# Patient Record
Sex: MALE | Race: WHITE | NOT HISPANIC OR LATINO | Employment: OTHER | URBAN - METROPOLITAN AREA
[De-identification: names, ages, dates, MRNs, and addresses within clinical notes are randomized per-mention and may not be internally consistent; named-entity substitution may affect disease eponyms.]

---

## 2019-04-07 ENCOUNTER — APPOINTMENT (OUTPATIENT)
Dept: RADIOLOGY | Facility: MEDICAL CENTER | Age: 34
End: 2019-04-07
Attending: EMERGENCY MEDICINE
Payer: COMMERCIAL

## 2019-04-07 ENCOUNTER — APPOINTMENT (OUTPATIENT)
Dept: RADIOLOGY | Facility: MEDICAL CENTER | Age: 34
End: 2019-04-07
Attending: SURGERY
Payer: COMMERCIAL

## 2019-04-07 ENCOUNTER — HOSPITAL ENCOUNTER (OUTPATIENT)
Facility: MEDICAL CENTER | Age: 34
End: 2019-04-08
Attending: EMERGENCY MEDICINE | Admitting: SURGERY
Payer: COMMERCIAL

## 2019-04-07 DIAGNOSIS — S06.0X0A CONCUSSION WITHOUT LOSS OF CONSCIOUSNESS, INITIAL ENCOUNTER: ICD-10-CM

## 2019-04-07 DIAGNOSIS — G51.4 FACIAL TWITCHING: ICD-10-CM

## 2019-04-07 DIAGNOSIS — R47.01 EXPRESSIVE APHASIA: ICD-10-CM

## 2019-04-07 PROBLEM — F90.9 ADHD (ATTENTION DEFICIT HYPERACTIVITY DISORDER): Status: ACTIVE | Noted: 2019-04-07

## 2019-04-07 PROBLEM — T14.90XA TRAUMA: Status: ACTIVE | Noted: 2019-04-07

## 2019-04-07 PROBLEM — Z02.9 DISCHARGE PLANNING ISSUES: Status: ACTIVE | Noted: 2019-04-07

## 2019-04-07 PROBLEM — Z75.8 DISCHARGE PLANNING ISSUES: Status: ACTIVE | Noted: 2019-04-07

## 2019-04-07 PROBLEM — Z78.9 NO CONTRAINDICATION TO DEEP VEIN THROMBOSIS (DVT) PROPHYLAXIS: Status: ACTIVE | Noted: 2019-04-07

## 2019-04-07 PROCEDURE — 700111 HCHG RX REV CODE 636 W/ 250 OVERRIDE (IP): Performed by: EMERGENCY MEDICINE

## 2019-04-07 PROCEDURE — 36415 COLL VENOUS BLD VENIPUNCTURE: CPT

## 2019-04-07 PROCEDURE — 70450 CT HEAD/BRAIN W/O DYE: CPT

## 2019-04-07 PROCEDURE — 307740 HCHG GREEN TRAUMA TEAM SERVICES

## 2019-04-07 PROCEDURE — 70551 MRI BRAIN STEM W/O DYE: CPT

## 2019-04-07 PROCEDURE — A9270 NON-COVERED ITEM OR SERVICE: HCPCS | Performed by: EMERGENCY MEDICINE

## 2019-04-07 PROCEDURE — 72125 CT NECK SPINE W/O DYE: CPT

## 2019-04-07 PROCEDURE — 700102 HCHG RX REV CODE 250 W/ 637 OVERRIDE(OP): Performed by: EMERGENCY MEDICINE

## 2019-04-07 PROCEDURE — G0378 HOSPITAL OBSERVATION PER HR: HCPCS

## 2019-04-07 PROCEDURE — 99285 EMERGENCY DEPT VISIT HI MDM: CPT

## 2019-04-07 RX ORDER — ONDANSETRON 4 MG/1
4 TABLET, ORALLY DISINTEGRATING ORAL ONCE
Status: COMPLETED | OUTPATIENT
Start: 2019-04-07 | End: 2019-04-07

## 2019-04-07 RX ORDER — ENEMA 19; 7 G/133ML; G/133ML
1 ENEMA RECTAL
Status: DISCONTINUED | OUTPATIENT
Start: 2019-04-07 | End: 2019-04-08 | Stop reason: HOSPADM

## 2019-04-07 RX ORDER — BISACODYL 10 MG
10 SUPPOSITORY, RECTAL RECTAL
Status: DISCONTINUED | OUTPATIENT
Start: 2019-04-07 | End: 2019-04-08 | Stop reason: HOSPADM

## 2019-04-07 RX ORDER — LEVETIRACETAM 250 MG/1
500 TABLET ORAL 2 TIMES DAILY
Status: DISCONTINUED | OUTPATIENT
Start: 2019-04-07 | End: 2019-04-08 | Stop reason: HOSPADM

## 2019-04-07 RX ORDER — AMOXICILLIN 250 MG
1 CAPSULE ORAL NIGHTLY
Status: DISCONTINUED | OUTPATIENT
Start: 2019-04-07 | End: 2019-04-08 | Stop reason: HOSPADM

## 2019-04-07 RX ORDER — DOCUSATE SODIUM 100 MG/1
100 CAPSULE, LIQUID FILLED ORAL 2 TIMES DAILY
Status: DISCONTINUED | OUTPATIENT
Start: 2019-04-07 | End: 2019-04-08 | Stop reason: HOSPADM

## 2019-04-07 RX ORDER — MECLIZINE HYDROCHLORIDE 25 MG/1
25 TABLET ORAL 3 TIMES DAILY PRN
Qty: 30 TAB | Refills: 0 | Status: SHIPPED | OUTPATIENT
Start: 2019-04-07

## 2019-04-07 RX ORDER — DEXTROAMPHETAMINE SACCHARATE, AMPHETAMINE ASPARTATE, DEXTROAMPHETAMINE SULFATE AND AMPHETAMINE SULFATE 5; 5; 5; 5 MG/1; MG/1; MG/1; MG/1
20 TABLET ORAL 2 TIMES DAILY
COMMUNITY
End: 2022-02-23

## 2019-04-07 RX ORDER — BUTALBITAL, ACETAMINOPHEN AND CAFFEINE 50; 325; 40 MG/1; MG/1; MG/1
1 TABLET ORAL EVERY 4 HOURS PRN
Status: DISCONTINUED | OUTPATIENT
Start: 2019-04-07 | End: 2019-04-08 | Stop reason: HOSPADM

## 2019-04-07 RX ORDER — POLYETHYLENE GLYCOL 3350 17 G/17G
1 POWDER, FOR SOLUTION ORAL 2 TIMES DAILY
Status: DISCONTINUED | OUTPATIENT
Start: 2019-04-07 | End: 2019-04-08 | Stop reason: HOSPADM

## 2019-04-07 RX ORDER — OXYCODONE HYDROCHLORIDE 5 MG/1
5 TABLET ORAL
Status: DISCONTINUED | OUTPATIENT
Start: 2019-04-07 | End: 2019-04-08

## 2019-04-07 RX ORDER — ACETAMINOPHEN 325 MG/1
650 TABLET ORAL EVERY 6 HOURS
Status: DISCONTINUED | OUTPATIENT
Start: 2019-04-08 | End: 2019-04-08 | Stop reason: HOSPADM

## 2019-04-07 RX ORDER — AMOXICILLIN 250 MG
1 CAPSULE ORAL
Status: DISCONTINUED | OUTPATIENT
Start: 2019-04-07 | End: 2019-04-08 | Stop reason: HOSPADM

## 2019-04-07 RX ORDER — HYDROMORPHONE HYDROCHLORIDE 1 MG/ML
0.5 INJECTION, SOLUTION INTRAMUSCULAR; INTRAVENOUS; SUBCUTANEOUS
Status: DISCONTINUED | OUTPATIENT
Start: 2019-04-07 | End: 2019-04-08

## 2019-04-07 RX ORDER — ACETAMINOPHEN 500 MG
1000 TABLET ORAL ONCE
Status: COMPLETED | OUTPATIENT
Start: 2019-04-07 | End: 2019-04-07

## 2019-04-07 RX ORDER — ONDANSETRON 2 MG/ML
4 INJECTION INTRAMUSCULAR; INTRAVENOUS EVERY 4 HOURS PRN
Status: DISCONTINUED | OUTPATIENT
Start: 2019-04-07 | End: 2019-04-08 | Stop reason: HOSPADM

## 2019-04-07 RX ADMIN — ONDANSETRON 4 MG: 4 TABLET, ORALLY DISINTEGRATING ORAL at 20:51

## 2019-04-07 RX ADMIN — ACETAMINOPHEN 1000 MG: 500 TABLET ORAL at 21:13

## 2019-04-07 ASSESSMENT — ENCOUNTER SYMPTOMS
SENSORY CHANGE: 0
TINGLING: 0
PALPITATIONS: 0
FEVER: 0
DOUBLE VISION: 0
SEIZURES: 0
VOMITING: 0
EYE PAIN: 0
LOSS OF CONSCIOUSNESS: 0
COUGH: 0
HEADACHES: 1
DIZZINESS: 0
PHOTOPHOBIA: 0
ROS SKIN COMMENTS: NO WOUNDS
SHORTNESS OF BREATH: 0
NAUSEA: 1
ABDOMINAL PAIN: 0
BLURRED VISION: 0
TREMORS: 0
BACK PAIN: 1
FOCAL WEAKNESS: 0
FLANK PAIN: 0
SORE THROAT: 0
CHILLS: 0

## 2019-04-08 ENCOUNTER — PATIENT OUTREACH (OUTPATIENT)
Dept: HEALTH INFORMATION MANAGEMENT | Facility: OTHER | Age: 34
End: 2019-04-08

## 2019-04-08 VITALS
DIASTOLIC BLOOD PRESSURE: 68 MMHG | TEMPERATURE: 97.9 F | HEART RATE: 77 BPM | BODY MASS INDEX: 29.95 KG/M2 | OXYGEN SATURATION: 96 % | HEIGHT: 73 IN | WEIGHT: 225.97 LBS | SYSTOLIC BLOOD PRESSURE: 121 MMHG | RESPIRATION RATE: 17 BRPM

## 2019-04-08 PROCEDURE — G0378 HOSPITAL OBSERVATION PER HR: HCPCS

## 2019-04-08 PROCEDURE — 92523 SPEECH SOUND LANG COMPREHEN: CPT

## 2019-04-08 PROCEDURE — 97165 OT EVAL LOW COMPLEX 30 MIN: CPT

## 2019-04-08 PROCEDURE — 97161 PT EVAL LOW COMPLEX 20 MIN: CPT

## 2019-04-08 PROCEDURE — 700102 HCHG RX REV CODE 250 W/ 637 OVERRIDE(OP): Performed by: SURGERY

## 2019-04-08 PROCEDURE — 94760 N-INVAS EAR/PLS OXIMETRY 1: CPT

## 2019-04-08 PROCEDURE — A9270 NON-COVERED ITEM OR SERVICE: HCPCS | Performed by: SURGERY

## 2019-04-08 RX ORDER — BUTALBITAL, ACETAMINOPHEN AND CAFFEINE 50; 325; 40 MG/1; MG/1; MG/1
1 TABLET ORAL EVERY 4 HOURS PRN
Qty: 20 TAB | Refills: 0 | Status: SHIPPED | OUTPATIENT
Start: 2019-04-08 | End: 2019-04-15

## 2019-04-08 RX ORDER — ACETAMINOPHEN 325 MG/1
650 TABLET ORAL EVERY 6 HOURS
Qty: 30 TAB | Refills: 0 | COMMUNITY
Start: 2019-04-08 | End: 2022-02-23

## 2019-04-08 RX ADMIN — ACETAMINOPHEN 650 MG: 325 TABLET, FILM COATED ORAL at 17:28

## 2019-04-08 ASSESSMENT — ENCOUNTER SYMPTOMS
SHORTNESS OF BREATH: 0
ABDOMINAL PAIN: 0
CHILLS: 0
SENSORY CHANGE: 0
VOMITING: 0
HEADACHES: 1
NAUSEA: 0
FOCAL WEAKNESS: 0
FEVER: 0
MYALGIAS: 0
COUGH: 0
DOUBLE VISION: 0
BLURRED VISION: 0
BACK PAIN: 0
NECK PAIN: 0

## 2019-04-08 ASSESSMENT — COPD QUESTIONNAIRES
DURING THE PAST 4 WEEKS HOW MUCH DID YOU FEEL SHORT OF BREATH: NONE/LITTLE OF THE TIME
COPD SCREENING SCORE: 0
DO YOU EVER COUGH UP ANY MUCUS OR PHLEGM?: NO/ONLY WITH OCCASIONAL COLDS OR INFECTIONS
HAVE YOU SMOKED AT LEAST 100 CIGARETTES IN YOUR ENTIRE LIFE: NO/DON'T KNOW

## 2019-04-08 ASSESSMENT — COGNITIVE AND FUNCTIONAL STATUS - GENERAL
DAILY ACTIVITIY SCORE: 24
MOBILITY SCORE: 22
SUGGESTED CMS G CODE MODIFIER DAILY ACTIVITY: CH
CLIMB 3 TO 5 STEPS WITH RAILING: A LITTLE
SUGGESTED CMS G CODE MODIFIER MOBILITY: CJ
WALKING IN HOSPITAL ROOM: A LITTLE

## 2019-04-08 ASSESSMENT — ACTIVITIES OF DAILY LIVING (ADL): TOILETING: INDEPENDENT

## 2019-04-08 ASSESSMENT — LIFESTYLE VARIABLES
EVER_SMOKED: NEVER
EVER_SMOKED: YES

## 2019-04-08 ASSESSMENT — GAIT ASSESSMENTS
DISTANCE (FEET): 600
GAIT LEVEL OF ASSIST: SUPERVISED

## 2019-04-08 NOTE — ASSESSMENT & PLAN NOTE
Admission head CT negative for acute intracranial injury.  Significant expressive aphasia / left eye twitching  MRI brain with no acute intracranial posttraumatic sequelae  SLP for cognitive evaluation

## 2019-04-08 NOTE — PROGRESS NOTES
AUBREY Wallace notified that pt strongly wants his IV out despite education on importance of keeping it in. AUBREY Wallace ok.

## 2019-04-08 NOTE — PROGRESS NOTES
Trauma / Surgical Daily Progress Note    Date of Service  4/8/2019    Chief Complaint  33 y.o. male admitted 4/7/2019 with Trauma    Interval Events    New admit to neurosurgery floor  MRI brain without acute intracranial posttraumatic sequelae  Facial twitch resolved  Speech dramatically improved though still some issues  Tertiary survey completed  RAP / SBIRT completed    - Mobilize  - SLP for cognitive evaluation pending, discussed with speech therapy  - PT and OT evaluations    Review of Systems  Review of Systems   Constitutional: Negative for chills and fever.   Eyes: Negative for blurred vision and double vision.   Respiratory: Negative for cough and shortness of breath.    Cardiovascular: Negative for chest pain.   Gastrointestinal: Negative for abdominal pain, nausea and vomiting.   Musculoskeletal: Negative for back pain, joint pain, myalgias and neck pain.   Neurological: Positive for headaches. Negative for sensory change and focal weakness.        Vital Signs  Temp:  [36.4 °C (97.6 °F)-37.2 °C (98.9 °F)] 36.7 °C (98.1 °F)  Pulse:  [] 99  Resp:  [16-18] 18  BP: (113-159)/() 153/60  SpO2:  [96 %-100 %] 97 %    Physical Exam  Physical Exam   Constitutional: He is oriented to person, place, and time. He appears well-developed. No distress.   HENT:   Head: Normocephalic.   Eyes: Conjunctivae are normal.   Neck: No JVD present. No tracheal deviation present.   Cardiovascular: Normal rate, regular rhythm and intact distal pulses.    Pulmonary/Chest: Effort normal. No respiratory distress. He exhibits no tenderness.   Abdominal: Soft. He exhibits no distension. There is no tenderness. There is no guarding.   Musculoskeletal:   Moves all extremities   Neurological: He is alert and oriented to person, place, and time.   Skin: Skin is warm and dry.   Nursing note and vitals reviewed.      Laboratory  No results found for this or any previous visit (from the past 24 hour(s)).    Fluids    Intake/Output  Summary (Last 24 hours) at 04/08/19 0835  Last data filed at 04/07/19 2021   Gross per 24 hour   Intake                0 ml   Output                0 ml   Net                0 ml       Core Measures & Quality Metrics  Labs reviewed, Medications reviewed and Radiology images reviewed  Morris catheter: No Morris      DVT Prophylaxis: Not indicated at this time, ambulatory  DVT prophylaxis - mechanical: Not indicated at this time, ambulatory  Ulcer prophylaxis: Not indicated        Total Score: 0    ETOH Screening     Intervention complete date: 4/8/2019  Patient response to intervention: Screening negative, no further intervention warrented.   Patient demonstrats understanding of intervention.Plan of care:    has not been contacted.Follow up with: PCP  Total ETOH intervention time: 15 - 30 mintues      Assessment/Plan  Concussion without loss of consciousness- (present on admission)   Assessment & Plan    Admission head CT negative for acute intracranial injury.  Significant expressive aphasia / left eye twitching  MRI brain ordered  SLP for cognitive evaluation      ADHD (attention deficit hyperactivity disorder)- (present on admission)   Assessment & Plan    Chronic condition treated with Adderall.      No contraindication to deep vein thrombosis (DVT) prophylaxis- (present on admission)   Assessment & Plan    Ambulate TID.     Discharge planning issues- (present on admission)   Assessment & Plan    Date of admission: 4/7/2019  Date: NA Transfer orders from SICU  Date: NA Rehab/SNF consult   Cleared for discharge: No  Discharge delayed: No    Discharge date: NA     Trauma- (present on admission)   Assessment & Plan    Struck in head by falling tree branch while trimming trees x 2. First time no helmet, second time helmeted.  Trauma Green Activation.  Se Samuel MD. Trauma Surgery.        Discussed patient condition with RN, Therapies, Patient and trauma surgery, Dr. ZULEIKA Samuel.    Patient seen,  data reviewed and discussed.  Agree with assessment and plan.  ALEJANDRO

## 2019-04-08 NOTE — THERAPY
"Occupational Therapy Evaluation completed.   Functional Status: Pt is a 32 y/o male admitted for head contusion after 2 tree branches fell 40 feet onto his head. Pt is a  and was wearing a helmet upon impact. Pt had initial complaint of expressive aphasia and L eye twitch but neither symptom noticed during eval. Pt has bilaterally equal 5/5 MMT strenght of UE. Pt slightly unbalanced walking but self corrects. Pt reports subscapular pain on L side but reports he did not fall after getting hit in head. Pt advised to see PCP if pain get worse. No further acute OT needs, pt independent with all ADLs.   Plan of Care: Patient with no further skilled OT needs in the acute care setting at this time  Discharge Recommendations:  Equipment: No Equipment Needed. Post-acute therapy Currently anticipate no further skilled therapy needs once patient is discharged from the inpatient setting.    See \"Rehab Therapy-Acute\" Patient Summary Report for complete documentation.    "

## 2019-04-08 NOTE — ED NOTES
Met with pt at bedside and dicussed current medications and last doses   Taken no oral antibiotic use in last 30 days.

## 2019-04-08 NOTE — DISCHARGE PLANNING
Care Transition Team Assessment    Information Source  Orientation : Oriented x 4  Who is responsible for making decisions for patient? : Patient         Elopement Risk  Legal Hold: No  Ambulatory or Self Mobile in Wheelchair: Yes  Disoriented: No  Psychiatric Symptoms: None  History of Wandering: No  Elopement this Admit: No  Vocalizing Wanting to Leave: No  Displays Behaviors, Body Language Wanting to Leave: No-Not at Risk for Elopement  Elopement Risk: Not at Risk for Elopement    Interdisciplinary Discharge Planning  Does Admitting Nurse Feel This Could be a Complex Discharge?: No  Primary Care Physician: Dr. Barnes  Lives with - Patient's Self Care Capacity: Spouse  Support Systems: Spouse / Significant Other  Housing / Facility: [address] [facility]  Do You Take your Prescribed Medications Regularly: No (no prescriptions)  Able to Return to Previous ADL's: Yes  Mobility Issues: No  Prior Services: None  Patient Expects to be Discharged to:: Home  Assistance Needed: No  Durable Medical Equipment: Not Applicable    Discharge Preparedness  What is your plan after discharge?: Home with help  What are your discharge supports?: Spouse  Prior Functional Level: Ambulatory, Drives Self, Independent with Activities of Daily Living  Difficulity with ADLs: None  Difficulity with IADLs: Driving    Functional Assesment  Prior Functional Level: Ambulatory, Drives Self, Independent with Activities of Daily Living    Finances  Financial Barriers to Discharge: No  Prescription Coverage: Yes                   Domestic Abuse  Have you ever been the victim of abuse or violence?: No    Psychological Assessment  History of Substance Abuse: None    Discharge Risks or Barriers  Discharge risks or barriers?: No PCP    Anticipated Discharge Information  Anticipated discharge disposition: Home

## 2019-04-08 NOTE — H&P
Trauma History and Physical  4/7/2019    Attending Physician: Se Samuel MD.     CC: Trauma The patient was triaged as a Trauma Green in accordance with established pre hospital protols. An expeditious primary and secondary survey with required adjuncts was conducted. See Trauma Narrator for full details.    HPI: This is a 33 y.o male presents to [facility] Emergency Department as a trauma after being struck in the head by falling tree branches twice today.  Per coworker, he was struck twice  (2 separate events) by branches that fell on his head.  The coworker says that earlier today, there was a large tree branch that fell and hit him directly on the superior aspect of his head.  He was wearing a helmet at the time.  After the event he appeared confused for a period time but with an hour recovered and went back to work.  A second branch hit him on the head  again while he was wearing a helmet.  After that he had some jerking movement noted in his right arm and twitching at his left eye.   Since the event he has had trouble finding words.  He currently has no complaints of neck pain.  He has been ambulatory and denies any weakness in his upper or lower extremities.  He has had no nausea or vomiting.  He describes a generalized headache that is now improved.  He had no loss of consciousness with either event.  He did take ASA for the headache after the event.       No past medical history on file.    No past surgical history on file.    No current facility-administered medications for this encounter.      Current Outpatient Prescriptions   Medication Sig Dispense Refill   • meclizine (ANTIVERT) 25 MG Tab Take 1 Tab by mouth 3 times a day as needed for Dizziness, Nausea/Vomiting or Vertigo. 30 Tab 0   • amphetamine-dextroamphetamine (ADDERALL, 20MG,) 20 MG Tab Take 20 mg by mouth 2 times a day.         Social History     Social History   • Marital status:      Spouse name: N/A   •  "Number of children: N/A   • Years of education: N/A     Occupational History   • Not on file.     Social History Main Topics   • Smoking status: Not on file   • Smokeless tobacco: Not on file   • Alcohol use Not on file   • Drug use: Unknown   • Sexual activity: Not on file     Other Topics Concern   • Not on file     Social History Narrative   • No narrative on file       No family history on file.    Allergies:  Patient has no known allergies.    Review of Systems:  Constitutional: Negative for fever, chills, weight loss, malaise/fatigue and diaphoresis.   HENT: Negative for hearing loss, ear pain, nosebleeds, congestion, sore throat, neck pain, and ear discharge.    Eyes: Negative for blurred vision, double vision, and redness.   Respiratory: Negative for cough, sputum production, shortness of breath, wheezing and stridor.    Cardiovascular: Negative for chest pain, palpitations.   Gastrointestinal: Negative for heartburn, nausea, vomiting, abdominal pain, diarrhea, constipation.  Genitourinary: Negative for dysuria, urgency, frequency.   Musculoskeletal: Negative for myalgias, back pain, joint pain and falls.   Skin: Negative for itching and rash.  Neurological: Negative for dizziness, loss of consciousness, weakness.  Positive for headache.   Endo/Heme/Allergies: Negative for environmental allergies. Does not bruise/bleed easily.   Psychiatric/Behavioral: Negative for depression and substance abuse. The patient is not nervous/anxious.    Physical Exam:  /100   Pulse 98   Temp 37.2 °C (98.9 °F) (Temporal)   Resp 18   Ht 1.854 m (6' 1\")   Wt 104.3 kg (230 lb)   SpO2 98%     Constitutional: Awake, alert, oriented x3. No acute distress. GCS 15. E4 V5 M6.  Head: No cephalohematoma. Pupils are 2 mm,  reactive bilaterally. Midface stable. No malocclusion.  TMs clear bilaterally. No drainage from the mouth or nose. Twitch noted to left eye lid.  Neck: No tracheal deviation. No midline cervical spine " tenderness.   Cardiovascular: Normal rate, regular rhythm, normal heart sounds and intact distal pulses.  Exam reveals no gallop and no friction rub.  No murmur heard.  Pulmonary/Chest: Clavicles nontender to palpation. There is no chest wall tenderness bilaterally.  No crepitus. Positive breath sounds bilaterally.   Abdominal: Soft, nondistended. Nontender to palpation. Pelvis is stable to anterior-posterior compression.  Musculoskeletal: Right upper extremity grossly atraumatic, palpable radial pulse. 5/5  strength. Full ROM and strength at elbow.  Left upper extremity grossly atraumatic, palpable radial pulse. 5/5  strength. Full ROM and strength at elbow.  Right lower extremity grossly atraumatic. 5/5 strength in ankle plantar flexion and dorsiflexion. No pain and full ROM at right knee and hip.   Left  lower extremity grossly atraumatic. 5/5 strength in ankle plantar flexion and dorsiflexion. No pain and full ROM at left knee and hip.   Back: Midline thoracic and lumbar spines are nontender to palpation. No step-offs.   : Normal male external genitalia. Rectal exam not done. No blood visible at urethral meatus.   Neurological: Sensation intact to light touch dorsum and plantar surfaces of both feet and the medial and lateral aspects of both lower legs.  Sensation intact to light touch dorsum and plantar surfaces of both hands.   Skin: Skin is warm and dry.  No diaphoresis. No erythema. No pallor.     Labs:                    Radiology:  CT-CSPINE WITHOUT PLUS RECONS   Final Result         1. No acute fracture from C1 through T2 is visualized.         CT-HEAD W/O   Final Result         1. No acute intracranial abnormality. No evidence of acute intracranial hemorrhage or mass lesion.                     Assessment: This is a 33 y.o with severe concussion, expressive aphasia, left eye twitching    Plan:   Admit to floor  Neuro check q 4 h  MRI brain  SLP for cognitive evaluation  Tylenol / Fioricet for  HUI  Diet - clear and advance    Trauma  Struck in head by falling tree branch while trimming trees x 2. First time no helmet, second time helmeted.  Trauma Green Activation.  eS Samuel MD. Trauma Surgery.    Concussion without loss of consciousness  Admission head CT negative for acute intracranial injury.  Significant expressive aphasia / left eye twitching  MRI brain ordered  SLP for cognitive evaluation    Discharge planning issues  Date of admission: 4/7/2019  Date: NA Transfer orders from SICU  Date: NA Rehab/SNF consult   Cleared for discharge: No  Discharge delayed: No    Discharge date: NA    ADHD (attention deficit hyperactivity disorder)  Chronic condition treated with Adderall.    No contraindication to deep vein thrombosis (DVT) prophylaxis  Ambulate TID.      Time spent: Trauma / Critical Care Time 60 minutes excluding procedures.    Se Samuel MD  [facility]  [phone]

## 2019-04-08 NOTE — PROGRESS NOTES
Pt A&Ox4, denies any numbness/tingling/pain.    Bed alarm on, call light within reach, pt educated on importance of using the call light when he needs assistance, pt verbalized understanding.

## 2019-04-08 NOTE — DISCHARGE PLANNING
Anticipated Discharge Disposition:   Home    Action:   Met with pt and wife. They have given CM copy of insurance card which is [place]  [employer]. Wife said that pt used to see Dr. Barnes contact number [phone].    Called Dr. Barnes's office. Arpita gave pt an appointment for Wednesday at 230pm. CM has confirmed with Arpita that they take pt's insurance.     Copy of insurance card obtained and faxed to [facility] to be scanned into chart.    Barriers to Discharge:   none    Plan:   Dc home when clear.

## 2019-04-08 NOTE — ED PROVIDER NOTES
ED Provider Note     4/7/2019  8:23 PM    Means of Arrival: Walk In  History obtained by: patient, co-worker  Limitations:     CHIEF COMPLAINT  Chief Complaint   Patient presents with   • Trauma Green     Head injuy   Headache and neck pain after large tree branch fell on head, confusion      HPI  Girish Herr is a 33 y.o. male who presents after 2-3 branches fell on his head earlier today.  He was cutting down tree branches earlier today.  Presents with his coworker.  Coworker says that earlier today there was a large tree branch that fell and hit him directly on the superior aspect of his head.  He was not wearing a helmet at the time.  After the event he appeared confused for a period time but with an hour recovered and went back to cutting tree branches.  A second branch hit him on the head while he was wearing a helmet.  After that he had jerking movement at his right arm.  And since the event he has had trouble finding words, twitching at his left eye.  He also complains of neck pain.  He has been ambulatory and denies any weakness in his upper or lower extremities.  No vomiting, but he does have nausea.  He describes a generalized headache.  There is no episode of loss of consciousness.    REVIEW OF SYSTEMS  Review of Systems   Constitutional: Negative for chills and fever.   HENT: Negative for congestion and sore throat.    Eyes: Negative for blurred vision, double vision, photophobia and pain.   Respiratory: Negative for cough and shortness of breath.    Cardiovascular: Negative for chest pain, palpitations and leg swelling.   Gastrointestinal: Positive for nausea. Negative for abdominal pain and vomiting.   Genitourinary: Negative for flank pain.   Musculoskeletal: Positive for back pain.        Left sided back pain     Skin:        No wounds   Neurological: Positive for headaches. Negative for dizziness, tingling, tremors, sensory change, focal weakness, seizures and loss of consciousness.   All other  "systems reviewed and are negative.    See HPI for further details.    PAST MEDICAL HISTORY   denies any past medical history    SOCIAL HISTORY  Social History     Social History Main Topics   • Smoking status: Not on file   • Smokeless tobacco: Not on file   • Alcohol use Not on file   • Drug use: Unknown   • Sexual activity: Not on file       SURGICAL HISTORY  patient denies any surgical history    CURRENT MEDICATIONS  Home Medications     Reviewed by Liseth Kelly (Pharmacy Tech) on 04/07/19 at 2141  Med List Status: Complete   Medication Last Dose Status   amphetamine-dextroamphetamine (ADDERALL, 20MG,) 20 MG Tab 4/7/2019 Active                ALLERGIES  No Known Allergies    PHYSICAL EXAM  VITAL SIGNS: /74   Pulse 77   Temp 36.8 °C (98.3 °F) (Temporal)   Resp 17   Ht 1.854 m (6' 1\")   Wt 102.5 kg (225 lb 15.5 oz)   SpO2 97%   BMI 29.81 kg/m²     Pulse ox interpretation: I interpret this pulse ox as normal.  Constitutional: Alert in no apparent distress.  Sitting upright on bed with cervical collar in place.  HENT: No obvious signs of trauma on his head.  Bilateral external ears normal, Nose normal.   Eyes: Pupils are equal, Conjunctiva normal, Non-icteric.   Neck: Cervical collar in place.  This was briefly taken down in his head and neck were held midline.  He has mid, midline cervical spine tenderness.  I did not have him range his neck at this point.  No obvious step-offs.  Cardiovascular: Regular rate and rhythm, no murmurs. Symmetric distal pulses. No cyanosis of extremities. No peripheral edema of extremities.  Thorax & Lungs: Normal breath sounds, No respiratory distress, No wheezing, No chest tenderness.   Abdomen: Bowel sounds normal, Soft, No tenderness, No masses, No pulsatile masses. No peritoneal signs.  Skin: Warm, Dry, No erythema, No rash.   Back: No midline bony tenderness, he has mild reproducible pain to palpation at the left lower side of his back.  Musculoskeletal: Good " range of motion in all major joints. No tenderness to palpation or major deformities noted.   Neurologic: Alert oriented to person place time and situation.  He has intermittent spasming at left eye, eye lid twitching.  No facial droop.  5 out of 5 strength in bilateral upper and lower extremities.  No drift.  He is ambulatory on arrival.  His speech is clear.  He seems to have difficulty finding words at times.  Psychiatric:  Mood normal.   Physical Exam      DIAGNOSTIC STUDIES / PROCEDURES    LABS  Pertinent Labs & Imaging studies reviewed. (See chart for details)    RADIOLOGY  Pertinent Labs & Imaging studies reviewed. (See chart for details)    COURSE & MEDICAL DECISION MAKING  Pertinent Labs & Imaging studies reviewed. (See chart for details)    8:23 PM This is an emergent evaluation of a  33 y.o. male who presents with mild confusion, word finding difficulties, left eyelid twitching after instruction had twice by relatively large branches earlier today.  See neuro exam above.  I will give him a GCS of 15.  Differential diagnosis includes concussion, intracranial injury, cervical spine injury.  CT scan of the head and cervical spine will be done immediately.  He will be given OTD zofran immediately.    9:00 PM  CT head shows no intracranial injury.  CT C-spine shows no fractures.  I believe his symptoms are most likely due to concussion.       9:20 PM  I spoken with trauma surgery Dr. Samuel, I believe he should be admitted given neurologic changes after head trauma.  He continues to have left eyelid twitching, expressive aphasia.    The patient will return for worsening symptoms and is stable at the time of discharge. The patient verbalizes understanding.        FINAL IMPRESSION    ICD-10-CM   1. Concussion without loss of consciousness, initial encounterActive S06.0X0A   2. Expressive aphasia R47.01   3. Facial twitching G51.4            This dictation was created using voice recognition software. The  accuracy of the dictation is limited to the abilities of the software. I expect there may be some errors of grammar and possibly content. The nursing notes were reviewed and certain aspects of this information were incorporated into this note.    Electronically signed by: Blake Casillas II, 4/7/2019 8:23 PM

## 2019-04-08 NOTE — THERAPY
"Physical Therapy Evaluation completed.   Bed Mobility:  Supine to Sit: Supervised  Transfers: Sit to Stand: Supervised  Gait: Level Of Assist: Supervised with No Equipment Needed       Plan of Care: Patient with no further skilled PT needs in the acute care setting at this time  Discharge Recommendations: Equipment: No Equipment Needed. Anticipate that the patient will have no further physical therapy needs after discharge from the hospital.    See \"Rehab Therapy-Acute\" Patient Summary Report for complete documentation.     Pt is a 34yo male presenting to acute after tree branch fell on his head x2. Pt initially presenting with c/o expressive aphasia and L eye twitching. Twitching not observed. Friend reports pt is having difficulty with word finding, though not noticed throughout this session. BLE strength and sensation intact. Pt demonstrated all mobility at SPV level. No difficulties with bed mobility. Ambulated >600ft with no AD, SPV, lateral sway but no LOB. Pt able to demo higher level balance tasks while ambulating including head turns in all directions, speed and direction changes, abrupt stopping, and bending down to  objects. Pt negotiated 4 stairs, reciprocal gait, no LOB. Pt with no further acute PT needs, patient is currently not being actively followed for therapy services at this time, however may be seen if requested by attending provider for 1 more visit within 30 days to address any discharge or equipment needs.    "

## 2019-04-08 NOTE — CARE PLAN
Problem: Communication  Goal: The ability to communicate needs accurately and effectively will improve  Outcome: PROGRESSING AS EXPECTED  Patient able to communicate needs to staff. Call light within reach, patient educated to call for assistance. Patient calls appropriately. Will continue to assess.    Problem: Safety  Goal: Will remain free from injury  Outcome: PROGRESSING AS EXPECTED  Patient educated to call for assistance. Precautions in place; Call light within reach. Bed alarm in use.  Bed locked and in lowest position. Treaded socks in place. Hourly rounding. Will continue to monitor.

## 2019-04-08 NOTE — ED NOTES
MRI questionnaire completed, faxed. Pt denies N/V, headache, neck pain. Pt reports his speech is improving but not completely back to baseline, reporting speech is slow but easy to understand. Family states the pt is usually talkative, no difficulty with sentence formulation, report at this time the pt is having difficulty expression thoughts, creating complex, complete sentences.

## 2019-04-08 NOTE — ED NOTES
Pt had a 4 inch diameter tree land on his head this afternoon.  Not helmeted.  Denies LOC.  Pt had a second 4 inch diameter tree fall on his head this afternoon.  Denies LOC.  Was helmeted for second.  C/o difficulty getting words out.

## 2019-04-08 NOTE — PROGRESS NOTES
"00:20 Pt arrived to unit. Assumed care of pt. Pt A&Ox4. Denies numbness, tingling, and pain. Left eye twitching. Pt complains of tiredness. Pt oriented to room. Bed alarm on. Call light within reach. Bed locked and in lowest position. Treaded socks in place. SCDs refused at this time. Family at bedside. Pt's wife states \"his speech is improving\".   "

## 2019-04-08 NOTE — THERAPY
"Speech Language Therapy Evaluation completed to address cognition  Functional Status:  The patient was seen on this date for a cognitive linguistic evaluation. The patient was awake, alert and oriented to self, place, time and date during the session. The patient reported that he was having difficulty saying what he wants to say since admission. The patient was given a variety of cognitive-linguistic assessments. Testing revealed, strengths in orientation, simple auditory comprehension, written directions, and functional problem solving. He demonstrated increased difficulty with complex auditory comprehension, thought organization, auditory memory, and attention. The patient had difficulty completing a clock drawing. He used tick marks in place of numbers and required mod cues to use numbers instead. The patient reported that the term \"ten past eleven\" was very hard and he was \"trying his best\" but was unable to place hands on the face of the clock. Given results of cognitive-linguistic evaluation, would question if word finding difficulties are more consistent with patient’s difficulty with thought organization and attention vs language impairment/aphasia. The patient was educated on the effect that TBI/mild concussions has on cognitive functions. Patient self-identified difficulty with memory, and requested his wife be notified of SLP results. Patient called wife during session and education provided. Of note, patient inconsistently reported difficulties and awareness of deficits this session. The patient reported that his wife would be able to supervise him at home and that he would report to his PCP with any additional difficulty regarding his cognition at home, however, was perseverative on wanting to get back to work and driving quickly. Patient was forgetful of SLP recommendations during this session. At this time, recommend supervision during initial discharge phase with post-acute SLP follow up should " "difficulties persist or worsen. Patient and his wife were provided written education and notified to follow up with PCP with any difficulty or change in status.   Recommendations: 1) recommend supervision during initial discharge phase with post-acute SLP follow up should difficulties persist or worsen. 2) follow up with PCP with any difficulty or change in status.   Plan of Care: Will benefit from Speech Therapy 5 times per week  Post-Acute Therapy: Discharge to home with outpatient or home health for additional skilled therapy services.    See \"Rehab Therapy-Acute\" Patient Summary Report for complete documentation.   "

## 2019-04-08 NOTE — ASSESSMENT & PLAN NOTE
Struck in head by falling tree branch while trimming trees x 2. First time no helmet, second time helmeted.  Trauma Green Activation.  Se Samuel MD. Trauma Surgery.

## 2019-04-08 NOTE — ASSESSMENT & PLAN NOTE
Date of admission: 4/7/2019  Date: NA Transfer orders from SICU  Date: NA Rehab/SNF consult   Cleared for discharge: No  Discharge delayed: No    Discharge date: NA

## 2019-04-08 NOTE — ED NOTES
Called again to attempt report, placed on hold for an extended length of time. Called back to ask for charge and again placed on hold for an extended period of time.

## 2019-04-09 NOTE — PROGRESS NOTES
Per [facility], no need for printed prescription for meclizine. Pt given prescriptions for fiorecet and therapy by [facility].

## 2019-04-09 NOTE — DISCHARGE INSTRUCTIONS
Call or seek medical attention for questions or concerns   - Follow up with Dr. Samuel as needed   - Follow up with primary care provider within one weeks time   - Superversion upon discharge until cleared by outpatient provider   - Continue speech therapy   - Resume regular diet   - May take over the counter acetaminophen as needed for pain   - Continue daily over the counter stool softener while on narcotics   - No operation of machinery or motorized vehicles while under the influence of narcotics   - No alcohol, marijuana or illicit drug use while under the influence of narcotics   - No swimming, hot tubs, baths or wound submersion until cleared by outpatient provider. May shower   - No contact sports, strenuous activities, or heavy lifting until cleared by outpatient provider   - If respiratory decompensation, neurologic decompensation, or signs or symptoms of infection occur seek medical attention      Concussion, Adult  A concussion is a brain injury. It is caused by:  · A hit to the head.  · A quick and sudden movement (jolt) of the head or neck.  A concussion is usually not life threatening. Even so, it can cause serious problems. If you had a concussion before, you may have concussion-like problems after a hit to your head.  Follow these instructions at home:  General instructions  · Follow your doctor's directions carefully.  · Take medicines only as told by your doctor.  · Only take medicines your doctor says are safe.  · Do not drink alcohol until your doctor says it is okay. Alcohol and some drugs can slow down healing. They can also put you at risk for further injury.  · If you are having trouble remembering things, write them down.  · Try to do one thing at a time if you get distracted easily. For example, do not watch TV while making dinner.  · Talk to your family members or close friends when making important decisions.  · Follow up with your doctor as told.  · Watch your symptoms. Tell others to do  the same. Serious problems can sometimes happen after a concussion. Older adults are more likely to have these problems.  · Tell your teachers, school nurse, school counselor, , , or  about your concussion. Tell them about what you can or cannot do. They should watch to see if:  ¨ It gets even harder for you to pay attention or concentrate.  ¨ It gets even harder for you to remember things or learn new things.  ¨ You need more time than normal to finish things.  ¨ You become annoyed (irritable) more than before.  ¨ You are not able to deal with stress as well.  ¨ You have more problems than before.  · Rest. Make sure you:  ¨ Get plenty of sleep at night.  ¨ Go to sleep early.  ¨ Go to bed at the same time every day. Try to wake up at the same time.  ¨ Rest during the day.  ¨ Take naps when you feel tired.  · Limit activities where you have to think a lot or concentrate. These include:  ¨ Doing homework.  ¨ Doing work related to a job.  ¨ Watching TV.  ¨ Using the computer.  Returning To Your Regular Activities   Return to your normal activities slowly, not all at once. You must give your body and brain enough time to heal.  · Do not play sports or do other athletic activities until your doctor says it is okay.  · Ask your doctor when you can drive, ride a bicycle, or work other vehicles or machines. Never do these things if you feel dizzy.  · Ask your doctor about when you can return to work or school.  Preventing Another Concussion   It is very important to avoid another brain injury, especially before you have healed. In rare cases, another injury can lead to permanent brain damage, brain swelling, or death. The risk of this is greatest during the first 7-10 days after your injury. Avoid injuries by:  · Wearing a seat belt when riding in a car.  · Not drinking too much alcohol.  · Avoiding activities that could lead to a second concussion (such as contact sports).  · Wearing a  helmet when doing activities like:  ¨ Biking.  ¨ Skiing.  ¨ Skateboarding.  ¨ Skating.  · Making your home safer by:  ¨ Removing things from the floor or stairways that could make you trip.  ¨ Using grab bars in bathrooms and handrails by stairs.  ¨ Placing non-slip mats on floors and in bathtubs.  ¨ Improve lighting in dark areas.  Contact a doctor if:  · It gets even harder for you to pay attention or concentrate.  · It gets even harder for you to remember things or learn new things.  · You need more time than normal to finish things.  · You become annoyed (irritable) more than before.  · You are not able to deal with stress as well.  · You have more problems than before.  · You have problems keeping your balance.  · You are not able to react quickly when you should.  Get help if you have any of these problems for more than 2 weeks:  · Lasting (chronic) headaches.  · Dizziness or trouble balancing.  · Feeling sick to your stomach (nausea).  · Seeing (vision) problems.  · Being affected by noises or light more than normal.  · Feeling sad, low, down in the dumps, blue, gloomy, or empty (depressed).  · Mood changes (mood swings).  · Feeling of fear or nervousness about what may happen (anxiety).  · Feeling annoyed.  · Memory problems.  · Problems concentrating or paying attention.  · Sleep problems.  · Feeling tired all the time.  Get help right away if:  · You have bad headaches or your headaches get worse.  · You have weakness (even if it is in one hand, leg, or part of the face).  · You have loss of feeling (numbness).  · You feel off balance.  · You keep throwing up (vomiting).  · You feel tired.  · One black center of your eye (pupil) is larger than the other.  · You twitch or shake violently (convulse).  · Your speech is not clear (slurred).  · You are more confused, easily angered (agitated), or annoyed than before.  · You have more trouble resting than before.  · You are unable to recognize people or  places.  · You have neck pain.  · It is difficult to wake you up.  · You have unusual behavior changes.  · You pass out (lose consciousness).  This information is not intended to replace advice given to you by your health care provider. Make sure you discuss any questions you have with your health care provider.  Document Released: 12/06/2010 Document Revised: 05/25/2017 Document Reviewed: 07/10/2014  [employer] Patient Education © 2017 [employer]    Discharge Instructions    Discharged to home by car with relative. Discharged via wheelchair, hospital escort: Yes.  Special equipment needed: Not Applicable    Be sure to schedule a follow-up appointment with your primary care doctor or any specialists as instructed.     Discharge Plan:   Smoking Cessation Offered: Patient Refused  Influenza Vaccine Indication: Patient Refuses    I understand that a diet low in cholesterol, fat, and sodium is recommended for good health. Unless I have been given specific instructions below for another diet, I accept this instruction as my diet prescription.   Other diet: Regular    Special Instructions: None    · Is patient discharged on Warfarin / Coumadin?   No     Depression / Suicide Risk    As [facility] are [facility], it is important to learn how to keep safe from harming yourself.    Recognize the warning signs:  · Abrupt changes in personality, positive or negative- including increase in energy   · Giving away possessions  · Change in eating patterns- significant weight changes-  positive or negative  · Change in sleeping patterns- unable to sleep or sleeping all the time   · Unwillingness or inability to communicate  · Depression  · Unusual sadness, discouragement and loneliness  · Talk of wanting to die  · Neglect of personal appearance   · Rebelliousness- reckless behavior  · Withdrawal from people/activities they love  · Confusion- inability to concentrate     If you or a loved one  observes any of these behaviors or has concerns about self-harm, here's what you can do:  · Talk about it- your feelings and reasons for harming yourself  · Remove any means that you might use to hurt yourself (examples: pills, rope, extension cords, firearm)  · Get professional help from the community (Mental Health, Substance Abuse, psychological counseling)  · Do not be alone:Call your Safe Contact- someone whom you trust who will be there for you.  · Call your local CRISIS HOTLINE 941-1023 or 817-326-1173  · Call your local Children's Mobile Crisis Response Team [place] (198) 847-1035 or www.Good Thing  · Call the toll free National Suicide Prevention Hotlines   · National Suicide Prevention Lifeline 630-828-QZGK (2751)  · National Hope Line Network 800-SUICIDE (142-0218)

## 2019-04-09 NOTE — PROGRESS NOTES
Discharge instructions reviewed with pt including medications and follow up appointments.     Pt left cellphone in the room. RN contacted wife. Wife came back to  cellphone.

## 2021-11-22 ENCOUNTER — OFFICE VISIT (OUTPATIENT)
Dept: URGENT CARE | Facility: PHYSICIAN GROUP | Age: 36
End: 2021-11-22

## 2021-11-22 VITALS
BODY MASS INDEX: 30.81 KG/M2 | RESPIRATION RATE: 18 BRPM | TEMPERATURE: 99.2 F | WEIGHT: 232.5 LBS | HEART RATE: 101 BPM | OXYGEN SATURATION: 100 % | DIASTOLIC BLOOD PRESSURE: 82 MMHG | SYSTOLIC BLOOD PRESSURE: 132 MMHG | HEIGHT: 73 IN

## 2021-11-22 DIAGNOSIS — Z02.4 ENCOUNTER FOR DEPARTMENT OF TRANSPORTATION (DOT) EXAMINATION FOR DRIVING LICENSE RENEWAL: ICD-10-CM

## 2021-11-22 PROCEDURE — 7100 PR DOT PHYSICAL: Performed by: NURSE PRACTITIONER

## 2021-11-23 NOTE — PROGRESS NOTES
Subjective:     Girish Herr is a 36 y.o. 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  ALLERGIES: No Known Allergies  SURGHX: No past surgical history on file.  SOCHX:   Social History     Socioeconomic History   • Marital status:      Spouse name: Not on file   • Number of children: Not on file   • Years of education: Not on file   • Highest education level: Not on file   Occupational History   • Not on file   Tobacco Use   • Smoking status: Not on file   • Smokeless tobacco: Not on file   Substance and Sexual Activity   • Alcohol use: Not on file   • Drug use: Not on file   • Sexual activity: Not on file   Other Topics Concern   • Not on file   Social History Narrative   • Not on file     Social Determinants of Health     Financial Resource Strain:    • Difficulty of Paying Living Expenses: Not on file   Food Insecurity:    • Worried About Running Out of Food in the Last Year: Not on file   • Ran Out of Food in the Last Year: Not on file   Transportation Needs:    • Lack of Transportation (Medical): Not on file   • Lack of Transportation (Non-Medical): Not on file   Physical Activity:    • Days of Exercise per Week: Not on file   • Minutes of Exercise per Session: Not on file   Stress:    • Feeling of Stress : Not on file   Social Connections:    • Frequency of Communication with Friends and Family: Not on file   • Frequency of Social Gatherings with Friends and Family: Not on file   • Attends [employer] Services: Not on file   • Active Member of Clubs or Organizations: Not on file   • Attends Club or Organization Meetings: Not on file   • Marital Status: Not on file   Intimate Partner Violence:    • Fear of Current or Ex-Partner: Not on file   • Emotionally Abused: Not on file   • Physically Abused: Not on file   • Sexually Abused: Not on file   Housing Stability:    • Unable to Pay for Housing in the  "Last Year: Not on file   • Number of Places Lived in the Last Year: Not on file   • Unstable Housing in the Last Year: Not on file     FH: No family history on file.      Objective:   /82 (BP Location: Left arm, Patient Position: Sitting, BP Cuff Size: Adult)   Pulse (!) 101   Temp 37.3 °C (99.2 °F) (Temporal)   Resp 18   Ht 1.854 m (6' 1\")   Wt 105 kg (232 lb 8 oz)   SpO2 100%   BMI 30.67 kg/m²     Physical Exam  Please see scanned physical assessment form.  Assessment/Plan:   Assessment      1. Encounter for Department of Transportation (DOT) examination for driving license renewal     Patient cleared for 2 years.     "

## 2022-02-23 ENCOUNTER — OFFICE VISIT (OUTPATIENT)
Dept: URGENT CARE | Facility: PHYSICIAN GROUP | Age: 37
End: 2022-02-23
Payer: MEDICAID

## 2022-02-23 VITALS
RESPIRATION RATE: 12 BRPM | HEART RATE: 106 BPM | WEIGHT: 234 LBS | DIASTOLIC BLOOD PRESSURE: 64 MMHG | SYSTOLIC BLOOD PRESSURE: 152 MMHG | OXYGEN SATURATION: 97 % | HEIGHT: 72 IN | TEMPERATURE: 97.9 F | BODY MASS INDEX: 31.69 KG/M2

## 2022-02-23 DIAGNOSIS — L01.00 IMPETIGO: ICD-10-CM

## 2022-02-23 DIAGNOSIS — L29.9 PRURITIC CONDITION: ICD-10-CM

## 2022-02-23 PROCEDURE — 99203 OFFICE O/P NEW LOW 30 MIN: CPT | Performed by: NURSE PRACTITIONER

## 2022-02-23 RX ORDER — SULFAMETHOXAZOLE AND TRIMETHOPRIM 800; 160 MG/1; MG/1
1 TABLET ORAL 2 TIMES DAILY
Qty: 14 TABLET | Refills: 0 | Status: SHIPPED | OUTPATIENT
Start: 2022-02-23 | End: 2022-03-02

## 2022-02-23 RX ORDER — HYDROXYZINE HYDROCHLORIDE 25 MG/1
25 TABLET, FILM COATED ORAL 3 TIMES DAILY PRN
Qty: 30 TABLET | Refills: 0 | Status: SHIPPED | OUTPATIENT
Start: 2022-02-23

## 2022-03-01 ASSESSMENT — ENCOUNTER SYMPTOMS
CONSTITUTIONAL NEGATIVE: 1
EYES NEGATIVE: 1
PSYCHIATRIC NEGATIVE: 1
RESPIRATORY NEGATIVE: 1
CARDIOVASCULAR NEGATIVE: 1
NEUROLOGICAL NEGATIVE: 1
MUSCULOSKELETAL NEGATIVE: 1
GASTROINTESTINAL NEGATIVE: 1

## 2025-01-17 ENCOUNTER — HOSPITAL ENCOUNTER (OUTPATIENT)
Dept: RADIOLOGY | Facility: MEDICAL CENTER | Age: 40
End: 2025-01-17
Attending: ORTHOPAEDIC SURGERY
Payer: MEDICAID

## 2025-01-17 DIAGNOSIS — M25.572 ACUTE LEFT ANKLE PAIN: ICD-10-CM

## 2025-01-17 PROCEDURE — 73721 MRI JNT OF LWR EXTRE W/O DYE: CPT | Mod: LT
